# Patient Record
(demographics unavailable — no encounter records)

---

## 2024-11-07 NOTE — REVIEW OF SYSTEMS
[Joint Pain] : joint pain [Confused] : confusion [Difficulty Walking] : difficulty walking [Negative] : Allergic/Immunologic [Fatigue] : no fatigue [FreeTextEntry2] : elderly gentleman walking with a walker.  [FreeTextEntry9] : knee pain  [FreeTextEntry6] : sleep apnea on CPAP [de-identified] : forgetful, decrease short term memory.

## 2024-11-07 NOTE — RESULTS/DATA
[FreeTextEntry1] : Collected Date/Time:                   7/18/2022 07:30 EDT\par  Received Date/Time:                    7/19/2022 06:47 EDT\par  \par  Hematopathology Report - Auth (Verified)\par  \par  Specimen(s) Submitted\par  1  Right PIC\par  2  Bone marrow aspirate for Flow OP\par  \par  Final Diagnosis\par  1, 2. Bone marrow biopsy and bone marrow aspirate\par       - Cellular marrow with trilineage hematopoiesis with maturation.\par  \par  \par  See note and description.\par  \par  Diagnostic note:\par  Suggest correlation with clinical and cytogenetic findings.\par  \par  Comprehensive report with results of pending ancillary studies to follow.\par  \par  Ancillary studies\par  Special stains:  Bone marrow aspirate iron stain:   No spicules are present\par   to evaluate for iron stores; there are sufficient nRBC to evaluate for\par   ring sideroblasts and no ring sideroblasts are seen.\par  Flow cytometry  of bone marrow aspirate shows no increase in CD34, CD14 or\par    positive cells.  Heterogeneous population of T-cells (with normal\par   CD4 to CD8 ratio; including few NK-like T cells), slightly increased\par   proportion of natural killer cells, and polytypic B cells.\par  Immunohistochemical stains   (CD3, CD20, CD34, , CD15, CD71, MPO, E-\par  CAD, Factor VIII) were performed on block 1B.  There is no significant\par   increase in CD34 positive cells.   highlights few pronormobllasts\par   and rare mast cells.  CD15 and MPO highlight myeloid cells while CD71\par   highlights erythroid cells including some E-CAD-positive pronormoblasts.\par   CD3 highlights scattered T cells; CD20 shows nearly absent B cells.\par  \par

## 2024-11-07 NOTE — ASSESSMENT
[FreeTextEntry1] : 80 yo M. with h/o DM, HTN, sleep apnea, iron deficiency anemia. 7/2022- Hb dropped from 9 g/dl, will give Venofer 200 mg x 5 doses.  Bone marrow biopsy 7/2022- negative for MDS.  --Iron studies, Ferritin, Vitamin B12, Folate. Results are pending.  11/7/24; WBC 9.12 Hb 13.3 g/dl Hct 41%, MCV 86.7, PLTs 247  History of DM- Following with endocrinologist.  GI: reports been due for colonoscopy in 2024. Recommended patient to walk 15-20 minutes daily.   Greater than 50% of the encounter time was spent on counseling and coordination of care for JUDE    and I have spent 30   minutes of face-to-face time with the patient.  RTC in 6 months

## 2024-11-07 NOTE — PHYSICAL EXAM
[Ambulatory and capable of all self care but unable to carry out any work activities] : Status 2- Ambulatory and capable of all self care but unable to carry out any work activities. Up and about more than 50% of waking hours [Obese] : obese [Normal] : affect appropriate [de-identified] : trace pitting edema to BLE

## 2024-11-07 NOTE — HISTORY OF PRESENT ILLNESS
[0 - No Distress] : Distress Level: 0 [80: Normal activity with effort; some signs or symptoms of disease.] : 80: Normal activity with effort; some signs or symptoms of disease.  [de-identified] : 77 yo gentleman with PMHx of iron deficiency anemia treated with oral iron supplements, GI work up was negative.   Patient was a former patient of Dr Emmanuel Odonnell, recently seen by Dr Aleman; found to have mild leukocytosis and elevated IgG. No monoclonal band identified. Last iron infusion was on January, 2019. \par  \par  Patient is feeling well, denies dizziness, lightheadedness, palpitations or active bleeding.  [de-identified] : Patient presents for follow up. Reports feeling good with no acute symptoms. He has chronic fatigue and bilateral lower extremity weakness.   Patient is eating well, denies melena or hematochezia. Denies fevers, night sweats or weight loss.   7/26/22 Bone marrow biopsy was normal ( see report below).   No other changes in his medical, surgical or social history since 5/9/2024.

## 2025-05-23 NOTE — REVIEW OF SYSTEMS
[Joint Pain] : joint pain [Confused] : confusion [Difficulty Walking] : difficulty walking [Negative] : Allergic/Immunologic [Fatigue] : no fatigue [FreeTextEntry2] : elderly gentleman walking with a walker.  [FreeTextEntry6] : sleep apnea on CPAP [FreeTextEntry9] : knee pain  [de-identified] : forgetful, decrease short term memory.

## 2025-05-23 NOTE — PHYSICAL EXAM
[Ambulatory and capable of all self care but unable to carry out any work activities] : Status 2- Ambulatory and capable of all self care but unable to carry out any work activities. Up and about more than 50% of waking hours [Obese] : obese [Normal] : affect appropriate [de-identified] : trace pitting edema to BLE

## 2025-05-23 NOTE — HISTORY OF PRESENT ILLNESS
[0 - No Distress] : Distress Level: 0 [80: Normal activity with effort; some signs or symptoms of disease.] : 80: Normal activity with effort; some signs or symptoms of disease.  [de-identified] : 77 yo gentleman with PMHx of iron deficiency anemia treated with oral iron supplements, GI work up was negative.   Patient was a former patient of Dr Emmanuel Odonnell, recently seen by Dr Aleman; found to have mild leukocytosis and elevated IgG. No monoclonal band identified. Last iron infusion was on January, 2019. \par  \par  Patient is feeling well, denies dizziness, lightheadedness, palpitations or active bleeding.  [de-identified] : Patient presents for follow up. Reports feeling good with no acute symptoms. He has chronic fatigue and bilateral lower extremity weakness.   Patient is eating well, denies melena or hematochezia. Denies fevers, night sweats or weight loss.   7/26/22 Bone marrow biopsy was normal ( see report below).   No other changes in his medical, surgical or social history since 11/7/2024.

## 2025-05-23 NOTE — ASSESSMENT
[FreeTextEntry1] : 81 yo M. with h/o DM, HTN, sleep apnea, iron deficiency anemia. 7/2022- Hb dropped from 9 g/dl, will give Venofer 200 mg x 5 doses.  Bone marrow biopsy 7/2022- negative for MDS.  --Iron studies, Ferritin, Vitamin B12, Folate. Results are pending.  5/23/25- WBC 9.5, Hb 13.3 g/dl, Hct 42.6%, MCV 87.1%, RDW 14.8%, .   History of DM- Following with endocrinologist.  Greater than 50% of the encounter time was spent on counseling and coordination of care for JUDE    and I have spent 30   minutes of face-to-face time with the patient.  RTC in 6 months